# Patient Record
Sex: FEMALE | Race: WHITE
[De-identification: names, ages, dates, MRNs, and addresses within clinical notes are randomized per-mention and may not be internally consistent; named-entity substitution may affect disease eponyms.]

---

## 2018-12-21 ENCOUNTER — HOSPITAL ENCOUNTER (OUTPATIENT)
Dept: HOSPITAL 88 - MRI | Age: 48
End: 2018-12-21
Attending: FAMILY MEDICINE
Payer: COMMERCIAL

## 2018-12-21 DIAGNOSIS — M53.82: Primary | ICD-10-CM

## 2018-12-21 PROCEDURE — 81025 URINE PREGNANCY TEST: CPT

## 2019-01-30 LAB
ANION GAP SERPL CALC-SCNC: 17.6 MMOL/L (ref 8–16)
BASOPHILS # BLD AUTO: 0 10*3/UL (ref 0–0.1)
BASOPHILS NFR BLD AUTO: 0.2 % (ref 0–1)
BUN SERPL-MCNC: 12 MG/DL (ref 7–26)
BUN/CREAT SERPL: 16 (ref 6–25)
CALCIUM SERPL-MCNC: 9.3 MG/DL (ref 8.4–10.2)
CHLORIDE SERPL-SCNC: 100 MMOL/L (ref 98–107)
CO2 SERPL-SCNC: 20 MMOL/L (ref 22–29)
DEPRECATED APTT PLAS QN: 26.9 SECONDS (ref 23.8–35.5)
DEPRECATED INR PLAS: 0.91
DEPRECATED NEUTROPHILS # BLD AUTO: 4.3 10*3/UL (ref 2.1–6.9)
EGFRCR SERPLBLD CKD-EPI 2021: > 60 ML/MIN (ref 60–?)
EOSINOPHIL # BLD AUTO: 0.3 10*3/UL (ref 0–0.4)
EOSINOPHIL NFR BLD AUTO: 4.3 % (ref 0–6)
ERYTHROCYTE [DISTWIDTH] IN CORD BLOOD: 12.3 % (ref 11.7–14.4)
GLUCOSE SERPLBLD-MCNC: 158 MG/DL (ref 74–118)
HCT VFR BLD AUTO: 44.6 % (ref 34.2–44.1)
HGB BLD-MCNC: 15.1 G/DL (ref 12–16)
LYMPHOCYTES # BLD: 1.4 10*3/UL (ref 1–3.2)
LYMPHOCYTES NFR BLD AUTO: 21.2 % (ref 18–39.1)
MCH RBC QN AUTO: 30.6 PG (ref 28–32)
MCHC RBC AUTO-ENTMCNC: 33.9 G/DL (ref 31–35)
MCV RBC AUTO: 90.3 FL (ref 81–99)
MONOCYTES # BLD AUTO: 0.5 10*3/UL (ref 0.2–0.8)
MONOCYTES NFR BLD AUTO: 8 % (ref 4.4–11.3)
NEUTS SEG NFR BLD AUTO: 65.4 % (ref 38.7–80)
PLATELET # BLD AUTO: 202 X10E3/UL (ref 140–360)
POTASSIUM SERPL-SCNC: 3.6 MMOL/L (ref 3.5–5.1)
PROTHROMBIN TIME: 13.1 SECONDS (ref 11.9–14.5)
RBC # BLD AUTO: 4.94 X10E6/UL (ref 3.6–5.1)
SODIUM SERPL-SCNC: 134 MMOL/L (ref 136–145)

## 2019-01-30 NOTE — DIAGNOSTIC IMAGING REPORT
EXAMINATION: PA and lateral views of the chest.



COMPARISON: None



CLINICAL HISTORY:  Preoperative evaluation, disc herniation

     

DISCUSSION:



Lines/tubes:  None.



Lungs:  The lungs are well inflated and clear. No pneumonia or pulmonary edema.



Pleura:  No pleural effusion or pneumothorax.



Heart and mediastinum:  The cardiomediastinal silhouette is normal.



Bones and soft tissues:  No acute bony abnormalities.  



IMPRESSION: 

No acute cardiopulmonary abnormalities.











Signed by: Dr. Andrew Palisch, M.D. on 1/30/2019 4:02 PM

## 2019-01-31 ENCOUNTER — HOSPITAL ENCOUNTER (OUTPATIENT)
Dept: HOSPITAL 88 - OR | Age: 49
Setting detail: OBSERVATION
LOS: 1 days | Discharge: HOME | End: 2019-02-01
Attending: NEUROLOGICAL SURGERY | Admitting: NEUROLOGICAL SURGERY
Payer: COMMERCIAL

## 2019-01-31 VITALS — DIASTOLIC BLOOD PRESSURE: 64 MMHG | SYSTOLIC BLOOD PRESSURE: 141 MMHG

## 2019-01-31 VITALS — SYSTOLIC BLOOD PRESSURE: 139 MMHG | DIASTOLIC BLOOD PRESSURE: 63 MMHG

## 2019-01-31 VITALS — SYSTOLIC BLOOD PRESSURE: 141 MMHG | DIASTOLIC BLOOD PRESSURE: 64 MMHG

## 2019-01-31 VITALS — BODY MASS INDEX: 48.82 KG/M2 | HEIGHT: 65 IN | WEIGHT: 293 LBS

## 2019-01-31 VITALS — SYSTOLIC BLOOD PRESSURE: 163 MMHG | DIASTOLIC BLOOD PRESSURE: 83 MMHG

## 2019-01-31 DIAGNOSIS — Z01.812: ICD-10-CM

## 2019-01-31 DIAGNOSIS — F41.9: ICD-10-CM

## 2019-01-31 DIAGNOSIS — Z79.84: ICD-10-CM

## 2019-01-31 DIAGNOSIS — Z88.5: ICD-10-CM

## 2019-01-31 DIAGNOSIS — M50.123: Primary | ICD-10-CM

## 2019-01-31 DIAGNOSIS — E66.01: ICD-10-CM

## 2019-01-31 DIAGNOSIS — E11.9: ICD-10-CM

## 2019-01-31 DIAGNOSIS — Z01.810: ICD-10-CM

## 2019-01-31 DIAGNOSIS — Z01.811: ICD-10-CM

## 2019-01-31 PROCEDURE — 77003 FLUOROGUIDE FOR SPINE INJECT: CPT

## 2019-01-31 PROCEDURE — 22845 INSERT SPINE FIXATION DEVICE: CPT

## 2019-01-31 PROCEDURE — 80048 BASIC METABOLIC PNL TOTAL CA: CPT

## 2019-01-31 PROCEDURE — 36415 COLL VENOUS BLD VENIPUNCTURE: CPT

## 2019-01-31 PROCEDURE — 85025 COMPLETE CBC W/AUTO DIFF WBC: CPT

## 2019-01-31 PROCEDURE — 85610 PROTHROMBIN TIME: CPT

## 2019-01-31 PROCEDURE — 93005 ELECTROCARDIOGRAM TRACING: CPT

## 2019-01-31 PROCEDURE — 71046 X-RAY EXAM CHEST 2 VIEWS: CPT

## 2019-01-31 PROCEDURE — 81025 URINE PREGNANCY TEST: CPT

## 2019-01-31 PROCEDURE — 85730 THROMBOPLASTIN TIME PARTIAL: CPT

## 2019-01-31 PROCEDURE — 22551 ARTHRD ANT NTRBDY CERVICAL: CPT

## 2019-01-31 PROCEDURE — 72020 X-RAY EXAM OF SPINE 1 VIEW: CPT

## 2019-01-31 PROCEDURE — 86900 BLOOD TYPING SEROLOGIC ABO: CPT

## 2019-01-31 PROCEDURE — 88304 TISSUE EXAM BY PATHOLOGIST: CPT

## 2019-01-31 PROCEDURE — 20931 SP BONE ALGRFT STRUCT ADD-ON: CPT

## 2019-01-31 PROCEDURE — 82948 REAGENT STRIP/BLOOD GLUCOSE: CPT

## 2019-01-31 PROCEDURE — 86850 RBC ANTIBODY SCREEN: CPT

## 2019-01-31 RX ADMIN — CEFAZOLIN SODIUM SCH MLS/HR: 1 SOLUTION INTRAVENOUS at 20:00

## 2019-01-31 RX ADMIN — OXYCODONE HYDROCHLORIDE AND ACETAMINOPHEN PRN EACH: 5; 325 TABLET ORAL at 20:43

## 2019-01-31 RX ADMIN — METFORMIN HYDROCHLORIDE SCH MG: 500 TABLET, FILM COATED ORAL at 17:10

## 2019-01-31 RX ADMIN — SODIUM CHLORIDE, POTASSIUM CHLORIDE, SODIUM LACTATE AND CALCIUM CHLORIDE SCH MLS/HR: 600; 310; 30; 20 INJECTION, SOLUTION INTRAVENOUS at 21:10

## 2019-01-31 RX ADMIN — INSULIN LISPRO SCH UNIT: 100 INJECTION, SOLUTION INTRAVENOUS; SUBCUTANEOUS at 17:47

## 2019-01-31 RX ADMIN — SODIUM CHLORIDE, POTASSIUM CHLORIDE, SODIUM LACTATE AND CALCIUM CHLORIDE SCH MLS/HR: 600; 310; 30; 20 INJECTION, SOLUTION INTRAVENOUS at 14:39

## 2019-01-31 NOTE — NUR
patient received from pacu via stretcher. see admit assess. dressing to anterior neck dry 
and intact. soft cervical collar in place. LR at 120cc/hr. family at BS. vitals stable with 
no distress.

## 2019-01-31 NOTE — NUR
REPORT RECEIVED FROM OFF GOING NURSE, PT RESTING IN BED ALERT AND ORIENTED, SOFT CERVICAL 
COLLAR WORN, NO COMPLICATIONS NOTED, BED LOCKED AND LOW, IV INFUSING PER ORDER, CALL LIGHT 
IN REACH, INSTRUCTED TO CALL WITH NEEDS

## 2019-01-31 NOTE — OPERATIVE REPORT
DATE OF PROCEDURE:  January 31, 2019 

 

PREOPERATIVE DIAGNOSIS:  C6-C7 disk herniation with radiculopathy, M50.123. 





POSTOPERATIVE DIAGNOSIS:  C6-C7 disk herniation with radiculopathy, 

M50.123.



PROCEDURES

1. C6-7 anterior cervical diskectomy and allograft fusion and plating, 

20951.

2. Preparation of [employer] cortical 

cancellous allograft, 26892.

3. C6-7 anterior cervical plating with Synthes ZPN plate, 61522.



ANESTHESIA:  General.



INDICATIONS:  The patient is a 48-year-old woman who presents with C6-C7 

disk herniation and right C7 radiculopathy.  She was taken to the operating 

room for anterior cervical decompression and fusion.



PROCEDURE:  After the induction of general anesthesia, the patient was 

placed on the operating table in the supine position.  The right side of 

the neck was prepped and draped in a sterile fashion.  A fluoroscopic C-arm 

was positioned in cross-table lateral orientation.  A transverse incision 

was created on the right side of the neck superimposed on the C6-7 disk 

space as determined by fluoroscopy.  The platysma was divided in line with 

the incision.  A subplatysmal dissection was carried out.  An avascular 

plane of dissection was developed medial to the sternocleidomastoid muscle 

and was followed medial to the carotid sheath to the anterior border of the 

cervical spine.  The deep cervical fascia was opened.  The esophagus was 

retracted to the left.  The attachments of the longus coli muscles to the 

anterolateral aspects of the vertebral bodies of C6 and C7 were divided.  

The anterior longitudinal ligament was resected.  Hadley posts were 

inserted into C6 and C7.  The Hadley distractor was used distract the disk 

space.  The anterior annulus of the disk was incised a #11 blade, and the 

contents of the disk were thoroughly evacuated with angled curets and 

pituitary rongeurs.  The posterior osteophytes were meticulously drilled 

with a 2-mm cutting bur until they were completely removed.  The posterior 

annulus of the disk, herniated disk material and the posterior longitudinal 

ligament were dissected layer by layer until the dura was fully exposed and 

decompressed.  The medial aspects of the uncinate processes were resected 

bilaterally.  Herniated disk material was retrieved and removed from the 

right C7 neural foramen.  Meticulous hemostasis was secured.  The disk 

space was sized and found to be 8 mm in height.  A piece of MTF cortical 

cancellous allograft measuring 8 mm in thickness was selected and prepared 

in saline and loaded onto a Synthes ZPN plate.  The construct was inserted 

into the C6-8 disk space under distraction and fluoroscopic guidance.  The 

distraction was released, and the distraction posts were removed.  The 

plate was screwed to the endplates of C6 and C7 with 2 pairs of 14-mm 

screws.  All screws were locked.  An excellent construct was obtained.  The 

wound was copiously irrigated with Bacitracin solution.  Meticulous 

hemostasis was secured.  The retractor was removed.  The platysma was 

closed with 3-0 Vicryl sutures.  The skin was closed with 4-0 Monocryl 

sutures in a subcuticular fashion.  Steri-Strips and dressing were applied. 

 Patient was awakened, extubated and taken to the postanesthesia care unit 

in stable condition.  No intraoperative complications were encountered.  

Estimated loss was 10 mL. 









DD:  01/31/2019 12:56

DT:  01/31/2019 14:33

Job#:  [id number]

## 2019-01-31 NOTE — XMS REPORT
Patient Summary Document

                             Created on: 2019



NAREN RODRÍGUEZ

External Reference #: [id number]

: 1970

Sex: Female



Demographics







                          Address                   [address]

[place]  [address]

 

                          Home Phone                [phone]

 

                          Preferred Language        Unknown

 

                          Marital Status            Unknown

 

                          Rastafari Affiliation     Unknown

 

                          Race                      Unknown

 

                                        Additional Race(s)  

 

                          Ethnic Group              Unknown





Author







                          Author                    [facility]

 

                          Address                   Unknown

 

                          Phone                     Unavailable







Care Team Providers







                    Care Team Member Name    Role                Phone

 

                    ROSANNA BANKS    Unavailable         Unavailable







Problems

This patient has no known problems.



Allergies, Adverse Reactions, Alerts

This patient has no known allergies or adverse reactions.



Medications

This patient has no known medications.



Results







           Test Description    Test Time    Test Comments    Text Results    Atomic Results    Result

 Comments

 

                CHEST 2 VIEWS    2019 16:02:00                                                             

                                             [place] [address]  
   Patient Name: NAREN RODRÍGUEZ                                   MR #: [id number] 
                   : 1970                                   Age/Sex: 
48/F  Acct #: [id number]                              Req #: [id number]  Adm 
Physician:                                                      Ordered by: 
ROSANNA BANKS MD                            Report #: [id number]        
Location: OR                                      Room/Bed:                     
___________________________________________________________________________________________________
   Procedure: [id number] DX/CHEST 2 VIEWS  Exam Date: 19                   
        Exam Time: 1500                                              REPORT 
STATUS: Signed       EXAMINATION: PA and lateral views of the chest.      COMPAR
MEHDI: None      CLINICAL HISTORY:  Preoperative evaluation, disc herniation     
     DISCUSSION:      Lines/tubes:  None.      Lungs:  The lungs are well 
inflated and clear. No pneumonia or pulmonary edema.      Pleura:  No pleural 
effusion or pneumothorax.      Heart and mediastinum:  The cardiomediastinal 
silhouette is normal.      Bones and soft tissues:  No acute bony abnormalities.
       IMPRESSION:    No acute cardiopulmonary abnormalities.                  
Signed by: Dr. Andrew Palisch, M.D. on 2019 4:02 PM        Dictated By: 
ANDREW R PALISCH MD  Electronically Signed By: ANDREW R PALISCH MD on 19 
1602  Transcribed By: BRODY on 19 1602       COPY TO:   ROSANNA BANKS MD

## 2019-02-01 VITALS — SYSTOLIC BLOOD PRESSURE: 148 MMHG | DIASTOLIC BLOOD PRESSURE: 65 MMHG

## 2019-02-01 VITALS — DIASTOLIC BLOOD PRESSURE: 62 MMHG | SYSTOLIC BLOOD PRESSURE: 133 MMHG

## 2019-02-01 VITALS — SYSTOLIC BLOOD PRESSURE: 157 MMHG | DIASTOLIC BLOOD PRESSURE: 69 MMHG

## 2019-02-01 VITALS — DIASTOLIC BLOOD PRESSURE: 62 MMHG | SYSTOLIC BLOOD PRESSURE: 128 MMHG

## 2019-02-01 VITALS — DIASTOLIC BLOOD PRESSURE: 64 MMHG | SYSTOLIC BLOOD PRESSURE: 141 MMHG

## 2019-02-01 VITALS — DIASTOLIC BLOOD PRESSURE: 65 MMHG | SYSTOLIC BLOOD PRESSURE: 148 MMHG

## 2019-02-01 RX ADMIN — CEFAZOLIN SODIUM SCH MLS/HR: 1 SOLUTION INTRAVENOUS at 04:00

## 2019-02-01 RX ADMIN — METFORMIN HYDROCHLORIDE SCH MG: 500 TABLET, FILM COATED ORAL at 08:23

## 2019-02-01 RX ADMIN — INSULIN LISPRO SCH UNIT: 100 INJECTION, SOLUTION INTRAVENOUS; SUBCUTANEOUS at 08:33

## 2019-02-01 RX ADMIN — CEFAZOLIN SODIUM SCH MLS/HR: 1 SOLUTION INTRAVENOUS at 12:55

## 2019-02-01 RX ADMIN — SODIUM CHLORIDE, POTASSIUM CHLORIDE, SODIUM LACTATE AND CALCIUM CHLORIDE SCH MLS/HR: 600; 310; 30; 20 INJECTION, SOLUTION INTRAVENOUS at 05:30

## 2019-02-01 RX ADMIN — OXYCODONE HYDROCHLORIDE AND ACETAMINOPHEN PRN EACH: 5; 325 TABLET ORAL at 09:24

## 2019-02-01 RX ADMIN — INSULIN LISPRO SCH UNIT: 100 INJECTION, SOLUTION INTRAVENOUS; SUBCUTANEOUS at 13:07

## 2019-02-01 NOTE — DIAGNOSTIC IMAGING REPORT
Exam:  spine lateral 1 view



History:  disc herniation



Comparison: None.



Findings: No fracture or malalignment. Anterior cervical fusion of C6-C7 with

low profile interbody cage. No complication. Mild degenerative disease C5-C6.

No abnormal soft tissue calcification or soft tissue defect.  



Impression:



No acute osseous abnormality



Anterior cervical fusion of C6-C7 with low profile interbody cage.



Signed by: Dr. Andrew Palisch, M.D. on 2/1/2019 1:15 PM

## 2019-02-01 NOTE — NUR
Pt discharged with all personal belonging. All discharge instructions explained to patient 
and spouse and verbalized. understanding. Prescription for pain medication given to patient.